# Patient Record
Sex: MALE | Race: WHITE | NOT HISPANIC OR LATINO | Employment: UNEMPLOYED | URBAN - METROPOLITAN AREA
[De-identification: names, ages, dates, MRNs, and addresses within clinical notes are randomized per-mention and may not be internally consistent; named-entity substitution may affect disease eponyms.]

---

## 2017-11-10 ENCOUNTER — TELEPHONE (OUTPATIENT)
Dept: FAMILY MEDICINE | Facility: CLINIC | Age: 27
End: 2017-11-10

## 2017-11-10 DIAGNOSIS — B86 SCABIES INFESTATION: ICD-10-CM

## 2017-11-10 DIAGNOSIS — B86 SCABIES INFESTATION: Primary | ICD-10-CM

## 2017-11-10 RX ORDER — PERMETHRIN 50 MG/G
CREAM TOPICAL
Qty: 60 G | Refills: 1 | Status: SHIPPED | OUTPATIENT
Start: 2017-11-10

## 2017-11-10 RX ORDER — PERMETHRIN 50 MG/G
CREAM TOPICAL
Qty: 60 G | Refills: 1 | Status: SHIPPED | OUTPATIENT
Start: 2017-11-10 | End: 2017-11-10

## 2018-09-21 ENCOUNTER — OFFICE VISIT - HEALTHEAST (OUTPATIENT)
Dept: FAMILY MEDICINE | Facility: CLINIC | Age: 28
End: 2018-09-21

## 2018-09-21 DIAGNOSIS — M54.42 LEFT-SIDED LOW BACK PAIN WITH LEFT-SIDED SCIATICA: ICD-10-CM

## 2021-05-31 ENCOUNTER — RECORDS - HEALTHEAST (OUTPATIENT)
Dept: ADMINISTRATIVE | Facility: CLINIC | Age: 31
End: 2021-05-31

## 2021-06-02 VITALS — WEIGHT: 269.9 LBS | BODY MASS INDEX: 35.61 KG/M2

## 2021-06-20 NOTE — PROGRESS NOTES
Subjective:      Patient ID: Refugio Milian is a 28 y.o. male.    Chief Complaint:    HPI  28-year-old male comes in with complaints of left-sided back pain radiating down the left leg for the past several days.  He also notes some numbness and tingling in the left foot.  He has not noticed any weakness in his legs.  There is been no trauma or injury.  He has no previous history of back problems.  He has not had a fever.  The pain does not radiate to the thoracic spine.  He has not had any bowel or bladder incontinence.  He has not had any saddle numbness.    No past medical history on file.    No past surgical history on file.    No family history on file.    Social History   Substance Use Topics     Smoking status: Light Tobacco Smoker     Smokeless tobacco: Never Used     Alcohol use Yes       Review of Systems   Musculoskeletal: Positive for back pain. Negative for arthralgias, gait problem, joint swelling, myalgias, neck pain and neck stiffness.   Neurological: Positive for numbness. Negative for dizziness, tremors, seizures, syncope, facial asymmetry, speech difficulty, weakness, light-headedness and headaches.   All other systems reviewed and are negative.      Objective:     /86 (Patient Site: Right Arm, Patient Position: Sitting, Cuff Size: Adult Regular)  Pulse 95  Temp 98.6  F (37  C)  Resp 20  Wt (!) 269 lb 14.4 oz (122.4 kg)  SpO2 97%  BMI 35.61 kg/m2    Physical Exam   Constitutional: He is oriented to person, place, and time. He appears well-developed and well-nourished. He appears distressed.   HENT:   Head: Normocephalic and atraumatic.   Eyes: Conjunctivae and EOM are normal. Pupils are equal, round, and reactive to light.   Neck: Normal range of motion. Neck supple. No thyromegaly present.   Cardiovascular: Normal rate and regular rhythm.    Pulmonary/Chest: Effort normal and breath sounds normal. No respiratory distress.   Musculoskeletal:        Lumbar back: He exhibits decreased  range of motion, pain and spasm. He exhibits no tenderness, no bony tenderness, no swelling, no edema, no deformity, no laceration and normal pulse.   Lymphadenopathy:     He has no cervical adenopathy.   Neurological: He is alert and oriented to person, place, and time. He has normal strength. He displays normal reflexes. A sensory deficit is present. He exhibits normal muscle tone. Coordination and gait normal.   Skin: Skin is warm and dry.   Psychiatric: He has a normal mood and affect. His behavior is normal. Judgment and thought content normal.   Nursing note and vitals reviewed.      Assessment:     Procedures    The encounter diagnosis was Left-sided low back pain with left-sided sciatica.    Plan:   1.  Gabapentin 200 mg 3 times daily ×10 days  2.  Prednisone 20 mg twice daily ×10 days  3.  Ibuprofen 800 mg 4 times daily ×10 days  4.  Physical therapy referral  5.  Follow-up for consideration of epidural steroid injection if physical therapy not helpful

## 2023-04-20 NOTE — TELEPHONE ENCOUNTER
Patient needs script for permethrin 5% cream.     Nissa Sena, [facility]     
Saw wife today for recurrent scabies infection, will treat family.   Isabela Crane PA-C    
Yes

## 2024-02-21 ENCOUNTER — HOSPITAL ENCOUNTER (EMERGENCY)
Facility: HOSPITAL | Age: 34
End: 2024-02-21